# Patient Record
Sex: FEMALE | Race: OTHER | HISPANIC OR LATINO | URBAN - METROPOLITAN AREA
[De-identification: names, ages, dates, MRNs, and addresses within clinical notes are randomized per-mention and may not be internally consistent; named-entity substitution may affect disease eponyms.]

---

## 2017-07-04 ENCOUNTER — EMERGENCY (EMERGENCY)
Facility: HOSPITAL | Age: 42
LOS: 1 days | Discharge: HOME | End: 2017-07-04
Attending: EMERGENCY MEDICINE
Payer: SELF-PAY

## 2017-07-04 VITALS
HEART RATE: 66 BPM | RESPIRATION RATE: 18 BRPM | DIASTOLIC BLOOD PRESSURE: 75 MMHG | HEIGHT: 63 IN | OXYGEN SATURATION: 99 % | WEIGHT: 134.92 LBS | TEMPERATURE: 98 F | SYSTOLIC BLOOD PRESSURE: 123 MMHG

## 2017-07-04 DIAGNOSIS — F41.9 ANXIETY DISORDER, UNSPECIFIED: ICD-10-CM

## 2017-07-04 PROCEDURE — 99283 EMERGENCY DEPT VISIT LOW MDM: CPT | Mod: 25

## 2017-07-04 PROCEDURE — 99284 EMERGENCY DEPT VISIT MOD MDM: CPT | Mod: 25

## 2017-07-04 PROCEDURE — 99053 MED SERV 10PM-8AM 24 HR FAC: CPT

## 2017-07-04 PROCEDURE — 96372 THER/PROPH/DIAG INJ SC/IM: CPT

## 2017-07-04 RX ORDER — KETOROLAC TROMETHAMINE 30 MG/ML
30 SYRINGE (ML) INJECTION ONCE
Qty: 0 | Refills: 0 | Status: DISCONTINUED | OUTPATIENT
Start: 2017-07-04 | End: 2017-07-04

## 2017-07-04 RX ORDER — ALPRAZOLAM 0.25 MG
0.5 TABLET ORAL ONCE
Qty: 0 | Refills: 0 | Status: DISCONTINUED | OUTPATIENT
Start: 2017-07-04 | End: 2017-07-04

## 2017-07-04 RX ADMIN — Medication 0.5 MILLIGRAM(S): at 23:37

## 2017-07-04 RX ADMIN — Medication 30 MILLIGRAM(S): at 23:36

## 2017-07-04 NOTE — ED PROVIDER NOTE - OBJECTIVE STATEMENT
40 y/o F with no significant PMHx presents to ED c/o L arm pain and numbness, and numbness in face x 2 weeks after recent murder of family member. Pt's sister relates that she is also short of breath and unable to sleep because of how anxious she is. Pt has history of events causing grief: she has been deported, she was in care home for several months, and she has had two deaths in the family. Denies any SI, HI or any other complaints. Pt's sister states that she will take pt to her PMD for continuation of treatment once PMD is back from vacation.

## 2017-07-04 NOTE — ED PROVIDER NOTE - PROGRESS NOTE DETAILS
symptomatic improved, no arm pain at this point. stable for d/c. provided resources for psych, mental healthy, d/c with valium .5 mg. instructed not to operate heavy machinery. pt strongly refuses HI, SI. states just feels anxious about going to sleep because of recent deaths in the family.

## 2017-07-04 NOTE — ED PROVIDER NOTE - MEDICAL DECISION MAKING DETAILS
42 y/o F presenting with anxiety, insomnia and pain in L arm and back. Will give .5 mg Valium and Toradol.

## 2017-07-04 NOTE — ED ADULT TRIAGE NOTE - CHIEF COMPLAINT QUOTE
came in with c/o of left arm  pain and numbness x 2 days ago. also with c/o unable to sleep x 2 week and headache

## 2017-07-04 NOTE — ED ADULT NURSE NOTE - OBJECTIVE STATEMENT
ok for sister to interpret. Pt is co left arm pain, sob and numbness x 2 days ago. also with c/o unable to sleep x 2 week and headache. Pt just came from [place], Pt is going through grief of family death. denies SI.

## 2017-07-05 VITALS
RESPIRATION RATE: 18 BRPM | OXYGEN SATURATION: 99 % | HEART RATE: 68 BPM | DIASTOLIC BLOOD PRESSURE: 78 MMHG | SYSTOLIC BLOOD PRESSURE: 123 MMHG | TEMPERATURE: 99 F

## 2017-07-05 RX ORDER — ALPRAZOLAM 0.25 MG
1 TABLET ORAL
Qty: 28 | Refills: 0 | OUTPATIENT
Start: 2017-07-05 | End: 2017-07-19